# Patient Record
Sex: MALE | Race: OTHER | URBAN - METROPOLITAN AREA
[De-identification: names, ages, dates, MRNs, and addresses within clinical notes are randomized per-mention and may not be internally consistent; named-entity substitution may affect disease eponyms.]

---

## 2022-03-30 ENCOUNTER — EMERGENCY (EMERGENCY)
Facility: HOSPITAL | Age: 42
LOS: 0 days | Discharge: HOME | End: 2022-03-30
Attending: STUDENT IN AN ORGANIZED HEALTH CARE EDUCATION/TRAINING PROGRAM
Payer: COMMERCIAL

## 2022-03-30 VITALS
TEMPERATURE: 98 F | DIASTOLIC BLOOD PRESSURE: 78 MMHG | RESPIRATION RATE: 18 BRPM | SYSTOLIC BLOOD PRESSURE: 118 MMHG | OXYGEN SATURATION: 100 % | HEART RATE: 75 BPM

## 2022-03-30 VITALS
HEART RATE: 68 BPM | OXYGEN SATURATION: 98 % | TEMPERATURE: 98 F | RESPIRATION RATE: 18 BRPM | DIASTOLIC BLOOD PRESSURE: 85 MMHG | WEIGHT: 207.9 LBS | SYSTOLIC BLOOD PRESSURE: 129 MMHG | HEIGHT: 66 IN

## 2022-03-30 DIAGNOSIS — K64.4 RESIDUAL HEMORRHOIDAL SKIN TAGS: ICD-10-CM

## 2022-03-30 DIAGNOSIS — K62.89 OTHER SPECIFIED DISEASES OF ANUS AND RECTUM: ICD-10-CM

## 2022-03-30 DIAGNOSIS — K60.0 ACUTE ANAL FISSURE: ICD-10-CM

## 2022-03-30 PROCEDURE — 99282 EMERGENCY DEPT VISIT SF MDM: CPT

## 2022-03-30 RX ORDER — DOCUSATE SODIUM 100 MG
1 CAPSULE ORAL
Qty: 20 | Refills: 0
Start: 2022-03-30 | End: 2022-04-08

## 2022-03-30 RX ORDER — LIDOCAINE 4 G/100G
5 CREAM TOPICAL
Qty: 140 | Refills: 0
Start: 2022-03-30 | End: 2022-04-05

## 2022-03-30 RX ORDER — POLYETHYLENE GLYCOL 3350 17 G/17G
17 POWDER, FOR SOLUTION ORAL
Qty: 170 | Refills: 0
Start: 2022-03-30 | End: 2022-04-08

## 2022-03-30 RX ORDER — LIDOCAINE 4 G/100G
1 CREAM TOPICAL ONCE
Refills: 0 | Status: COMPLETED | OUTPATIENT
Start: 2022-03-30 | End: 2022-03-30

## 2022-03-30 RX ADMIN — LIDOCAINE 1 APPLICATION(S): 4 CREAM TOPICAL at 22:38

## 2022-03-30 NOTE — ED PROVIDER NOTE - PROVIDER TOKENS
FREE:[LAST:[colorectal],PHONE:[(   )    -],FAX:[(   )    -],ADDRESS:[[facility] [place]  [place]  Address: [address], [place] [address]  Phone: [phone]]]

## 2022-03-30 NOTE — ED PROVIDER NOTE - CARE PROVIDER_API CALL
colorectal,   [facility]  [place], [place]  Address: [address], [place] [address]  Phone: [phone]  Phone: (   )    -  Fax: (   )    -  Follow Up Time:

## 2022-03-30 NOTE — ED PROVIDER NOTE - OBJECTIVE STATEMENT
Otherwise healthy 40 yo M presents to the ED for rectal pain for x3 weeks. Pt reports bright red blood in stool. Pt noted today while using the bathroom he noted more blood then usual in the stool so he came to the ED. Pt noted using cream and suppository with no relief. Pt otherwise has no other complaints in the ED.

## 2022-03-30 NOTE — ED PROVIDER NOTE - PATIENT PORTAL LINK FT
You can access the FollowMyHealth Patient Portal offered by [facility] by registering at the following website: [email or web address]. By joining [employer]’s FollowMyHealth portal, you will also be able to view your health information using other applications (apps) compatible with our system.

## 2022-03-30 NOTE — ED ADULT TRIAGE NOTE - CHIEF COMPLAINT QUOTE
c/o rectal pain x 3 weeks used hemorrhoid suppository noted with blood during straining at bowel movement c/o abdominal pain

## 2022-03-30 NOTE — ED PROVIDER NOTE - PHYSICAL EXAMINATION
VITAL SIGNS: I have reviewed nursing notes and confirm.  CONSTITUTIONAL: well-appearing, non-toxic, NAD  SKIN: Warm dry, normal skin turgor  HEAD: NCAT  EYES: EOMI, PERRLA, no scleral icterus  ENT: Moist mucous membranes, normal pharynx with no erythema or exudates  NECK: Supple; non tender. Full ROM. No cervical LAD  CARD: RRR, no murmurs, rubs or gallops  RESP: clear to ausculation b/l.  No rales, rhonchi, or wheezing.  ABD: soft, + BS, non-tender, non-distended, no rebound or guarding. No CVA tenderness. Negative KARISSA, posterior anal fissure, small external hemorrhoids with bedside RN Ronn   NEURO: normal motor. normal sensory.  Normal gait.  PSYCH: Cooperative, appropriate.

## 2022-03-30 NOTE — ED ADULT NURSE NOTE - NSIMPLEMENTINTERV_GEN_ALL_ED
[FreeTextEntry1] : discussed w pt \par \par check routine labs as below \par \par diet, exercise, weight loss advised \par \par influenza vaccine discussed and administered today\par also discussed tdap vaccine and administered today \par \par he inquires re screening colonoscopy, but advised he may wait 1-2 years as he has no significant risk factors \par \par RTO yearly for routine exam or earlier prn if any new concerns 
Implemented All Fall Risk Interventions:  [place] to call system. Call bell, personal items and telephone within reach. Instruct patient to call for assistance. Room bathroom lighting operational. Non-slip footwear when patient is off stretcher. Physically safe environment: no spills, clutter or unnecessary equipment. Stretcher in lowest position, wheels locked, appropriate side rails in place. Provide visual cue, wrist band, yellow gown, etc. Monitor gait and stability. Monitor for mental status changes and reorient to person, place, and time. Review medications for side effects contributing to fall risk. Reinforce activity limits and safety measures with patient and family.

## 2022-03-30 NOTE — ED PROVIDER NOTE - NS ED ROS FT
CONST: no fevers, no chills, no trauma  EYES: no pain, no visual disturbances  ENT: no sore throat, no epistaxis, no rhinorrhea, no hearing changes  CV: no chest pain, no palpitations, no orthopnea, no extremity pain or swelling  RESP: no shortness of breath, no cough, no sputum, no pleurisy, no wheezing  ABD: no abdominal pain, no nausea, no vomiting, no diarrhea, (+) bloody stool, (+) rectal pain.   : no dysuria, no hematuria, no frequency, no urgency  MSK: no back pain, no neck pain, no extremity pain  NEURO: no headache, no sensory disturbances, no focal weakness, no dizziness  HEME: no easy bleeding or bruising  SKIN: no diaphoresis, no rash

## 2022-03-30 NOTE — ED PROVIDER NOTE - CLINICAL SUMMARY MEDICAL DECISION MAKING FREE TEXT BOX
posterior anal fissure  hemorrhoids   follow up colorectal surgery outpatient  sitz bath, viscous lidocaine for symptomatic pain, stool softeners  anticipatory guidance

## 2022-03-30 NOTE — ED PROVIDER NOTE - NSFOLLOWUPINSTRUCTIONS_ED_ALL_ED_FT
Hemorrhoids       Hemorrhoids are swollen veins in and around the rectum or anus. There are two types of hemorrhoids:  •Internal hemorrhoids. These occur in the veins that are just inside the rectum. They may poke through to the outside and become irritated and painful.      •External hemorrhoids. These occur in the veins that are outside the anus and can be felt as a painful swelling or hard lump near the anus.      Most hemorrhoids do not cause serious problems, and they can be managed with home treatments such as diet and lifestyle changes. If home treatments do not help the symptoms, procedures can be done to shrink or remove the hemorrhoids.      What are the causes?    This condition is caused by increased pressure in the anal area. This pressure may result from various things, including:  •Constipation.      •Straining to have a bowel movement.      •Diarrhea.      •Pregnancy.      •Obesity.      •Sitting for long periods of time.      •Heavy lifting or other activity that causes you to strain.      •Anal sex.      •Riding a bike for a long period of time.        What are the signs or symptoms?    Symptoms of this condition include:  •Pain.      •Anal itching or irritation.      •Rectal bleeding.      •Leakage of stool (feces).      •Anal swelling.      •One or more lumps around the anus.        How is this diagnosed?    This condition can often be diagnosed through a visual exam. Other exams or tests may also be done, such as:  •An exam that involves feeling the rectal area with a gloved hand (digital rectal exam).      •An exam of the anal canal that is done using a small tube (anoscope).      •A blood test, if you have lost a significant amount of blood.      •A test to look inside the colon using a flexible tube with a camera on the end (sigmoidoscopy or colonoscopy).        How is this treated?    This condition can usually be treated at home. However, various procedures may be done if dietary changes, lifestyle changes, and other home treatments do not help your symptoms. These procedures can help make the hemorrhoids smaller or remove them completely. Some of these procedures involve surgery, and others do not. Common procedures include:  •Rubber band ligation. Rubber bands are placed at the base of the hemorrhoids to cut off their blood supply.      •Sclerotherapy. Medicine is injected into the hemorrhoids to shrink them.      •Infrared coagulation. A type of light energy is used to get rid of the hemorrhoids.      •Hemorrhoidectomy surgery. The hemorrhoids are surgically removed, and the veins that supply them are tied off.      •Stapled hemorrhoidopexy surgery. The surgeon staples the base of the hemorrhoid to the rectal wall.        Follow these instructions at home:      Eating and drinking      •Eat foods that have a lot of fiber in them, such as whole grains, beans, nuts, fruits, and vegetables.      •Ask your health care provider about taking products that have added fiber (fiber supplements).      •Reduce the amount of fat in your diet. You can do this by eating low-fat dairy products, eating less red meat, and avoiding processed foods.      •Drink enough fluid to keep your urine pale yellow.        Managing pain and swelling      •Take warm sitz baths for 20 minutes, 3–4 times a day to ease pain and discomfort. You may do this in a bathtub or using a portable sitz bath that fits over the toilet.    •If directed, apply ice to the affected area. Using ice packs between sitz baths may be helpful.  •Put ice in a plastic bag.      •Place a towel between your skin and the bag.      •Leave the ice on for 20 minutes, 2–3 times a day.        General instructions     •Take over-the-counter and prescription medicines only as told by your health care provider.      •Use medicated creams or suppositories as told.      •Get regular exercise. Ask your health care provider how much and what kind of exercise is best for you. In general, you should do moderate exercise for at least 30 minutes on most days of the week (150 minutes each week). This can include activities such as walking, biking, or yoga.      •Go to the bathroom when you have the urge to have a bowel movement. Do not wait.      •Avoid straining to have bowel movements.      •Keep the anal area dry and clean. Use wet toilet paper or moist towelettes after a bowel movement.      • Do not sit on the toilet for long periods of time. This increases blood pooling and pain.      •Keep all follow-up visits as told by your health care provider. This is important.        Contact a health care provider if you have:    •Increasing pain and swelling that are not controlled by treatment or medicine.      •Difficulty having a bowel movement, or you are unable to have a bowel movement.      •Pain or inflammation outside the area of the hemorrhoids.        Get help right away if you have:    •Uncontrolled bleeding from your rectum.        Summary    •Hemorrhoids are swollen veins in and around the rectum or anus.      •Most hemorrhoids can be managed with home treatments such as diet and lifestyle changes.      •Taking warm sitz baths can help ease pain and discomfort.      •In severe cases, procedures or surgery can be done to shrink or remove the hemorrhoids.      This information is not intended to replace advice given to you by your health care provider. Make sure you discuss any questions you have with your health care provider.

## 2022-03-30 NOTE — ED ADULT NURSE NOTE - OBJECTIVE STATEMENT
Pt received in bed ISO20, 40 y/o male, A&Ox3, c/o rectal pain and constipation x 3 weeks, pt states he noted blood during straining at bowel movement and feeling of fullness and abdominal discomfort. Upon rectal examination, small hemorrhoid and cut noted in the rectal area. No active bleeding noted. Pt has PSHx  for hemorrhoids 15 years ago. PMHx gastritis.

## 2022-08-09 ENCOUNTER — EMERGENCY (EMERGENCY)
Facility: HOSPITAL | Age: 42
LOS: 0 days | Discharge: HOME | End: 2022-08-10
Attending: STUDENT IN AN ORGANIZED HEALTH CARE EDUCATION/TRAINING PROGRAM

## 2022-08-09 VITALS
SYSTOLIC BLOOD PRESSURE: 152 MMHG | OXYGEN SATURATION: 99 % | DIASTOLIC BLOOD PRESSURE: 79 MMHG | TEMPERATURE: 98 F | RESPIRATION RATE: 16 BRPM | HEART RATE: 66 BPM

## 2022-08-09 VITALS
SYSTOLIC BLOOD PRESSURE: 145 MMHG | WEIGHT: 210.1 LBS | RESPIRATION RATE: 16 BRPM | OXYGEN SATURATION: 99 % | DIASTOLIC BLOOD PRESSURE: 87 MMHG | HEIGHT: 66 IN | TEMPERATURE: 99 F | HEART RATE: 85 BPM

## 2022-08-09 DIAGNOSIS — R22.1 LOCALIZED SWELLING, MASS AND LUMP, NECK: ICD-10-CM

## 2022-08-09 DIAGNOSIS — Z20.822 CONTACT WITH AND (SUSPECTED) EXPOSURE TO COVID-19: ICD-10-CM

## 2022-08-09 DIAGNOSIS — R59.9 ENLARGED LYMPH NODES, UNSPECIFIED: ICD-10-CM

## 2022-08-09 LAB
ALBUMIN SERPL ELPH-MCNC: 4.1 G/DL — SIGNIFICANT CHANGE UP (ref 3.3–5)
ALP SERPL-CCNC: 88 U/L — SIGNIFICANT CHANGE UP (ref 40–120)
ALT FLD-CCNC: 68 U/L — SIGNIFICANT CHANGE UP (ref 12–78)
ANION GAP SERPL CALC-SCNC: 5 MMOL/L — SIGNIFICANT CHANGE UP (ref 5–17)
APPEARANCE UR: CLEAR — SIGNIFICANT CHANGE UP
AST SERPL-CCNC: 28 U/L — SIGNIFICANT CHANGE UP (ref 15–37)
BILIRUB SERPL-MCNC: 0.7 MG/DL — SIGNIFICANT CHANGE UP (ref 0.2–1.2)
BILIRUB UR-MCNC: NEGATIVE — SIGNIFICANT CHANGE UP
BUN SERPL-MCNC: 14 MG/DL — SIGNIFICANT CHANGE UP (ref 7–23)
CALCIUM SERPL-MCNC: 9.1 MG/DL — SIGNIFICANT CHANGE UP (ref 8.5–10.1)
CHLORIDE SERPL-SCNC: 110 MMOL/L — HIGH (ref 96–108)
CO2 SERPL-SCNC: 28 MMOL/L — SIGNIFICANT CHANGE UP (ref 22–31)
COLOR SPEC: YELLOW — SIGNIFICANT CHANGE UP
CREAT SERPL-MCNC: 1.27 MG/DL — SIGNIFICANT CHANGE UP (ref 0.5–1.3)
DIFF PNL FLD: NEGATIVE — SIGNIFICANT CHANGE UP
EGFR: 73 ML/MIN/1.73M2 — SIGNIFICANT CHANGE UP
FLUAV AG NPH QL: SIGNIFICANT CHANGE UP
FLUBV AG NPH QL: SIGNIFICANT CHANGE UP
GLUCOSE SERPL-MCNC: 94 MG/DL — SIGNIFICANT CHANGE UP (ref 70–99)
GLUCOSE UR QL: NEGATIVE MG/DL — SIGNIFICANT CHANGE UP
HCT VFR BLD CALC: 42.9 % — SIGNIFICANT CHANGE UP (ref 39–50)
HGB BLD-MCNC: 14.1 G/DL — SIGNIFICANT CHANGE UP (ref 13–17)
KETONES UR-MCNC: NEGATIVE — SIGNIFICANT CHANGE UP
LEUKOCYTE ESTERASE UR-ACNC: NEGATIVE — SIGNIFICANT CHANGE UP
MCHC RBC-ENTMCNC: 30 PG — SIGNIFICANT CHANGE UP (ref 27–34)
MCHC RBC-ENTMCNC: 32.9 G/DL — SIGNIFICANT CHANGE UP (ref 32–36)
MCV RBC AUTO: 91.3 FL — SIGNIFICANT CHANGE UP (ref 80–100)
NITRITE UR-MCNC: NEGATIVE — SIGNIFICANT CHANGE UP
NRBC # BLD: 0 /100 WBCS — SIGNIFICANT CHANGE UP (ref 0–0)
PH UR: 7 — SIGNIFICANT CHANGE UP (ref 5–8)
PLATELET # BLD AUTO: 222 K/UL — SIGNIFICANT CHANGE UP (ref 150–400)
POTASSIUM SERPL-MCNC: 3.9 MMOL/L — SIGNIFICANT CHANGE UP (ref 3.5–5.3)
POTASSIUM SERPL-SCNC: 3.9 MMOL/L — SIGNIFICANT CHANGE UP (ref 3.5–5.3)
PROT SERPL-MCNC: 7.9 GM/DL — SIGNIFICANT CHANGE UP (ref 6–8.3)
PROT UR-MCNC: 15 MG/DL
RBC # BLD: 4.7 M/UL — SIGNIFICANT CHANGE UP (ref 4.2–5.8)
RBC # FLD: 12.9 % — SIGNIFICANT CHANGE UP (ref 10.3–14.5)
SARS-COV-2 RNA SPEC QL NAA+PROBE: SIGNIFICANT CHANGE UP
SODIUM SERPL-SCNC: 143 MMOL/L — SIGNIFICANT CHANGE UP (ref 135–145)
SP GR SPEC: 1.01 — SIGNIFICANT CHANGE UP (ref 1.01–1.02)
UROBILINOGEN FLD QL: NEGATIVE MG/DL — SIGNIFICANT CHANGE UP
WBC # BLD: 5.46 K/UL — SIGNIFICANT CHANGE UP (ref 3.8–10.5)
WBC # FLD AUTO: 5.46 K/UL — SIGNIFICANT CHANGE UP (ref 3.8–10.5)

## 2022-08-09 PROCEDURE — 99284 EMERGENCY DEPT VISIT MOD MDM: CPT

## 2022-08-09 PROCEDURE — 71045 X-RAY EXAM CHEST 1 VIEW: CPT | Mod: 26

## 2022-08-09 NOTE — ED ADULT NURSE NOTE - OBJECTIVE STATEMENT
Received pt in the ED,AOx3, from triage. C/o lump R neck, L shoulder, L arm.  Pt endorsed the R neck lump is painful for 1 month. Pt denied cp, sob, n/v/d. Speaks full sentences, unlabored breathing on RA. Able to HERNANDEZ. No PMH.     used [place] ([id number])

## 2022-08-09 NOTE — ED PROVIDER NOTE - NSFOLLOWUPCLINICSTOKEN_GEN_ALL_ED_FT
784169:Routine|| ||00\01||False;449202:Routine|| ||00\01||False;847245:Routine|| ||00\01||False;822104:Routine|| ||00\01||False;

## 2022-08-09 NOTE — ED PROVIDER NOTE - PATIENT PORTAL LINK FT
You can access the FollowMyHealth Patient Portal offered by [facility] by registering at the following website: [email or web address]. By joining [employer]’s FollowMyHealth portal, you will also be able to view your health information using other applications (apps) compatible with our system.

## 2022-08-09 NOTE — ED PROVIDER NOTE - OBJECTIVE STATEMENT
42m no pmhx. presenting for 1 week of a bump on right side of neck and by left clavicle. no fevers, bumps were painful, but arent anymore. also has a bump on his left arm for 1 year. no dysuria, frequency. no weight loss, fatigue

## 2022-08-09 NOTE — ED PROVIDER NOTE - NSFOLLOWUPCLINICS_GEN_ALL_ED_FT
[facility] - [place]  Dermatology  [address] [place] [address]  Phone: [phone]  Fax: [phone]  Follow Up Time: Routine    [facility] - [place]  Dermatology  [address], [address]  [place] [address]  Phone: [phone]  Fax: [phone]  Follow Up Time: Routine    [facility] - [facility]  Dermatology  [address] [place] [address]  Phone: [phone]  Fax: [phone]  Follow Up Time: Routine    [facility] - [place]  Dermatology  [address], [address]  [place] [address]  Phone: [phone]  Fax: [phone]  Follow Up Time: Routine

## 2022-08-09 NOTE — ED PROVIDER NOTE - PHYSICAL EXAMINATION
General: Awake, alert and oriented. No acute distress. Well developed, hydrated and nourished. Appears stated age.   Skin: Skin in warm, dry and intact, small pustular lesion on left arm.   HENMT: head normocephalic and atraumatic; bilateral external ears without swelling. no nasal discharge. moist oral mucosa. supple neck, trachea midline, isoalted right anterior cervical small lynmph node, minimally tender, no other lymphadenopathy, no intraoral lesions/swelling  EYES: Conjunctiva clear. nonicteric sclera. EOM intact, Eyelids are normal in appearance without swelling or lesions.  Cardiac: well perfused  Respiratory: breathing comfortably on room air. no audible wheezing or stridor  Abdominal: nondistended  MSK: Neck and back are without deformity, visible external skin changes, or signs of trauma. Curvature of the cervical, thoracic, and lumbar spine are within normal limits. no external signs of trauma. no apparent deficits in ROM of any extremity  Neurological: The patient is awake, alert and oriented to person, place, and time with normal speech. CN 2-12 grossly intact. no apparent deficits. Memory is normal and thought process is intact. No gait abnormalities are appreciated.   Psychiatric: Appropriate mood and affect. Good judgement and insight. No visual or auditory hallucinations.

## 2022-08-09 NOTE — ED PROVIDER NOTE - CLINICAL SUMMARY MEDICAL DECISION MAKING FREE TEXT BOX
well appearing, arm lesion has been present chronically, no sign of infection/malignancy. stable for dc. will give dermatology follow up

## 2022-08-09 NOTE — ED ADULT TRIAGE NOTE - CHIEF COMPLAINT QUOTE
per  pt reports " I have a painful lump on the L side of my neck for 1 week" and "under my L clavicle" x yesterday. pt denies difficulty swallowing, rashes, and fever.

## 2022-08-10 LAB
RBC CASTS # UR COMP ASSIST: SIGNIFICANT CHANGE UP /HPF (ref 0–4)
T PALLIDUM AB TITR SER: NEGATIVE — SIGNIFICANT CHANGE UP
WBC UR QL: SIGNIFICANT CHANGE UP

## 2022-08-11 LAB
C TRACH RRNA SPEC QL NAA+PROBE: SIGNIFICANT CHANGE UP
N GONORRHOEA RRNA SPEC QL NAA+PROBE: SIGNIFICANT CHANGE UP
SPECIMEN SOURCE: SIGNIFICANT CHANGE UP